# Patient Record
Sex: FEMALE | Race: WHITE | NOT HISPANIC OR LATINO | Employment: FULL TIME | URBAN - METROPOLITAN AREA
[De-identification: names, ages, dates, MRNs, and addresses within clinical notes are randomized per-mention and may not be internally consistent; named-entity substitution may affect disease eponyms.]

---

## 2021-02-02 ENCOUNTER — IMMUNIZATION (OUTPATIENT)
Dept: LAB | Age: 71
End: 2021-02-02

## 2021-02-02 DIAGNOSIS — Z23 NEED FOR VACCINATION: Primary | ICD-10-CM

## 2021-02-02 PROCEDURE — 0011A COVID-19 MODERNA VACCINE: CPT

## 2021-02-02 PROCEDURE — 91301 COVID-19 MODERNA VACCINE: CPT

## 2021-03-06 ENCOUNTER — IMMUNIZATION (OUTPATIENT)
Dept: LAB | Age: 71
End: 2021-03-06

## 2021-03-06 DIAGNOSIS — Z23 NEED FOR VACCINATION: Primary | ICD-10-CM

## 2021-03-06 PROCEDURE — 0012A COVID-19 MODERNA VACCINE: CPT

## 2021-03-06 PROCEDURE — 91301 COVID-19 MODERNA VACCINE: CPT

## 2024-02-16 ENCOUNTER — TELEPHONE (OUTPATIENT)
Dept: URGENT CARE | Age: 74
End: 2024-02-16

## 2024-02-17 ENCOUNTER — APPOINTMENT (OUTPATIENT)
Dept: URGENT CARE | Age: 74
End: 2024-02-17

## 2024-04-06 ENCOUNTER — HOSPITAL ENCOUNTER (OUTPATIENT)
Age: 74
Discharge: HOME | End: 2024-04-06
Payer: COMMERCIAL

## 2024-04-06 VITALS
DIASTOLIC BLOOD PRESSURE: 85 MMHG | OXYGEN SATURATION: 96 % | RESPIRATION RATE: 20 BRPM | SYSTOLIC BLOOD PRESSURE: 150 MMHG | TEMPERATURE: 99 F | HEART RATE: 99 BPM

## 2024-04-06 DIAGNOSIS — R04.0 EPISTAXIS: Primary | ICD-10-CM

## 2024-04-06 RX ORDER — ATENOLOL 100 MG/1
100 TABLET ORAL DAILY
COMMUNITY

## 2024-04-06 RX ORDER — APIXABAN 5 MG/1
5 TABLET, FILM COATED ORAL 2 TIMES DAILY
COMMUNITY

## 2024-04-06 RX ORDER — SIMVASTATIN 40 MG
40 TABLET ORAL DAILY
COMMUNITY

## 2024-04-06 RX ORDER — FLUTICASONE PROPIONATE 50 MCG
1 SPRAY, SUSPENSION (ML) NASAL 2 TIMES DAILY
COMMUNITY
Start: 2023-12-18

## 2024-04-06 NOTE — ED PROVIDER NOTES
Patient Seen in: [facility]      History     Chief Complaint   Patient presents with    Nose Bleed     Stated Complaint: coughing up blood an hour ago    Subjective:   HPI    This is a well-appearing 73-year-old female with history of A-fib, hypertension, hyperlipidemia who is currently on Eliquis who presents with a nosebleed this morning approximately 730.  States the right nostril started bleeding lasting about 30 minutes.  Patient got the bleeding under control but became worried as this is never happened before so she came in.  She states that she did have bloody saliva but states that was from her nose.  Denies any headache or dizziness.  No complaints at this time.    Objective:   No pertinent past medical history.            No pertinent past surgical history.              No pertinent social history.            Review of Systems   HENT:  Positive for nosebleeds.    All other systems reviewed and are negative.      Positive for stated complaint: coughing up blood an hour ago  Other systems are as noted in HPI.  Constitutional and vital signs reviewed.      All other systems reviewed and negative except as noted above.    Physical Exam     ED Triage Vitals   BP 04/06/24 0934 150/85   Pulse 04/06/24 0926 99   Resp 04/06/24 0926 20   Temp 04/06/24 0926 98.5 °F (36.9 °C)   Temp src 04/06/24 0926 Temporal   SpO2 04/06/24 0926 96 %   O2 Device 04/06/24 0926 None (Room air)       Current:/85   Pulse 99   Temp 98.5 °F (36.9 °C) (Temporal)   Resp 20   SpO2 96%         Physical Exam  Vitals and nursing note reviewed.   Constitutional:       General: She is awake. She is not in acute distress.     Appearance: Normal appearance. She is not ill-appearing, toxic-appearing or diaphoretic.   HENT:      Head: Normocephalic and atraumatic.      Right Ear: Tympanic membrane, ear canal and external ear normal.      Left Ear: Tympanic membrane, ear canal and external ear normal.      Nose: Mucosal edema  present.      Mouth/Throat:      Mouth: Mucous membranes are moist.      Pharynx: Oropharynx is clear. Uvula midline.   Eyes:      General: Lids are normal.      Extraocular Movements: Extraocular movements intact.      Conjunctiva/sclera: Conjunctivae normal.      Pupils: Pupils are equal, round, and reactive to light.   Cardiovascular:      Rate and Rhythm: Normal rate and regular rhythm.      Pulses: Normal pulses.      Heart sounds: Normal heart sounds.   Pulmonary:      Effort: Pulmonary effort is normal.      Breath sounds: Normal breath sounds.   Skin:     General: Skin is warm and dry.      Capillary Refill: Capillary refill takes less than 2 seconds.   Neurological:      General: No focal deficit present.      Mental Status: She is alert and oriented to person, place, and time.   Psychiatric:         Mood and Affect: Mood normal.         Behavior: Behavior normal. Behavior is cooperative.         Thought Content: Thought content normal.         Judgment: Judgment normal.       ED Course   Labs Reviewed - No data to display       MDM         Medical Decision Making  Patient is well-appearing on exam, nontoxic appearance, exam as noted above.  Differential diagnosis including but not limited to epistaxis, resolved epistaxis.  All symptoms have resolved.  Vital signs stable. I did discuss with patient close follow-up with her primary care provider as recommended.  Patient verbalized plan of care and stated understanding.        Disposition and Plan     Clinical Impression:  1. Epistaxis         Disposition:  Discharge  4/6/2024  9:40 am    Follow-up:  Tiffany Sanchez MD  [address] [facility] [address]  [phone]                Medications Prescribed:  Discharge Medication List as of 4/6/2024  9:41 AM

## 2024-04-06 NOTE — ED INITIAL ASSESSMENT (HPI)
Pt taking eliquis. Pt reports clearing her throat, right nostril started bleeding, which started today at 0730. When she went to spit, there was blood in the saliva. Nosebleed for 20 min. Denies other bleeding or bruising.

## 2024-04-06 NOTE — DISCHARGE INSTRUCTIONS
Please follow-up with your primary care provider.  If you begin experience another nosebleed lasting longer than 30 minutes please seek medical attention.

## 2025-02-24 ENCOUNTER — HOSPITAL ENCOUNTER (OUTPATIENT)
Age: 75
Discharge: HOME | End: 2025-02-24
Payer: COMMERCIAL

## 2025-02-24 VITALS
RESPIRATION RATE: 18 BRPM | DIASTOLIC BLOOD PRESSURE: 59 MMHG | TEMPERATURE: 98 F | HEART RATE: 83 BPM | OXYGEN SATURATION: 97 % | SYSTOLIC BLOOD PRESSURE: 142 MMHG

## 2025-02-24 DIAGNOSIS — M54.50 ACUTE BILATERAL LOW BACK PAIN WITHOUT SCIATICA: Primary | ICD-10-CM

## 2025-02-24 LAB
BILIRUB UR QL STRIP: NEGATIVE
CLARITY UR: CLEAR
COLOR UR: YELLOW
GLUCOSE UR STRIP-MCNC: NEGATIVE MG/DL
HGB UR QL STRIP: NEGATIVE
KETONES UR STRIP-MCNC: NEGATIVE MG/DL
NITRITE UR QL STRIP: NEGATIVE
PH UR STRIP: 7 [PH]
PROT UR STRIP-MCNC: NEGATIVE MG/DL
SP GR UR STRIP: 1.02
UROBILINOGEN UR STRIP-ACNC: <2 MG/DL

## 2025-02-24 PROCEDURE — 81002 URINALYSIS NONAUTO W/O SCOPE: CPT | Performed by: PHYSICIAN ASSISTANT

## 2025-02-24 PROCEDURE — 99203 OFFICE O/P NEW LOW 30 MIN: CPT | Performed by: PHYSICIAN ASSISTANT

## 2025-02-24 NOTE — DISCHARGE INSTRUCTIONS
You will be contacted with the results of your urinalysis in the next 1 to 2 days.  If necessary antibiotics will be sent to the pharmacy at that time    You may continue to take Tylenol for the pain, warm compresses as needed    If you develop new or worsening symptoms, fevers you should be reevaluated

## 2025-02-24 NOTE — ED PROVIDER NOTES
Patient Seen in: [facility]      History     Chief Complaint   Patient presents with    Pain     Stated Complaint: back pain    Subjective:   HPI    74-year-old female presents to the immediate care for evaluation of low back pain, flank pain.  Patient reports the pain started 3 days ago in the bilateral low back with some radiation toward the left flank and left groin.  She states she felt a pulling in her abdominal wall when getting up from the examination table at her oncologist office last week.  Patient does not appreciate any urinary symptoms, hematuria, frequency.  She has not appreciated any rash in this area.  Yesterday she took Tylenol and applied a heating pad in the pain is largely resolved today except for minimal left groin discomfort when she walks.      Objective:     No pertinent past medical history.            No pertinent past surgical history.              No pertinent social history.            Review of Systems    Positive for stated complaint: back pain  Other systems are as noted in HPI.  Constitutional and vital signs reviewed.      All other systems reviewed and negative except as noted above.    Physical Exam     ED Triage Vitals [02/24/25 1128]   /59   Pulse 83   Resp 18   Temp 98.4 °F (36.9 °C)   Temp src Oral   SpO2 97 %   O2 Device None (Room air)       Current Vitals:   Vital Signs  BP: 142/59  Pulse: 83  Resp: 18  Temp: 98.4 °F (36.9 °C)  Temp src: Oral    Oxygen Therapy  SpO2: 97 %  O2 Device: None (Room air)        Physical Exam  Vitals and nursing note reviewed.   Constitutional:       General: She is not in acute distress.  HENT:      Head: Normocephalic and atraumatic.      Right Ear: External ear normal.      Left Ear: External ear normal.      Nose: Nose normal.      Mouth/Throat:      Mouth: Mucous membranes are moist.   Eyes:      Extraocular Movements: Extraocular movements intact.      Pupils: Pupils are equal, round, and reactive to light.    Cardiovascular:      Rate and Rhythm: Normal rate.   Pulmonary:      Effort: Pulmonary effort is normal.   Abdominal:      General: Abdomen is flat.      Tenderness: There is no abdominal tenderness.   Musculoskeletal:         General: Normal range of motion.      Cervical back: Normal range of motion.      Lumbar back: No tenderness.   Skin:     General: Skin is warm.   Neurological:      General: No focal deficit present.      Mental Status: She is alert and oriented to person, place, and time.   Psychiatric:         Mood and Affect: Mood normal.         Behavior: Behavior normal.             ED Course     Labs Reviewed   [facility] POCT URINALYSIS DIPSTICK - Abnormal; Notable for the following components:       Result Value    Leukocyte esterase urine Trace (*)     All other components within normal limits   URINE CULTURE, ROUTINE        74-year-old female presenting from home for evaluation of low back pain for 3 days.  Pain largely resolved this a.m. after Tylenol and warm compress.  She has no tenderness to the abdomen, flank or back.  There is no erythema, rash or ecchymosis.  Patient eating and drinking per normal, no nausea or vomiting.     Ddx-lumbar strain, spasm, UTI, kidney stone, sciatica, diverticulitis     Patient UA with trace leuks only.  Will send culture but defer antibiotics as patient is not experiencing any symptoms at this time.  We have discussed continued use of Tylenol as needed as well as PCP follow-up and ER return precautions           MDM              Medical Decision Making      Disposition and Plan     Clinical Impression:  1. Acute bilateral low back pain without sciatica         Disposition:  Discharge  2/24/2025 12:22 pm    Follow-up:  No follow-up provider specified.          Medications Prescribed:  Discharge Medication List as of 2/24/2025 12:25 PM              Supplementary Documentation:

## 2025-02-24 NOTE — ED INITIAL ASSESSMENT (HPI)
Pt c/o lower back pain + right upper thigh pain started started 3 days ago that is getting worst yesterday

## 2025-02-27 RX ORDER — NITROFURANTOIN 25; 75 MG/1; MG/1
100 CAPSULE ORAL 2 TIMES DAILY
Qty: 14 CAPSULE | Refills: 0 | Status: SHIPPED | OUTPATIENT
Start: 2025-02-27 | End: 2025-03-06